# Patient Record
Sex: FEMALE | Race: WHITE | NOT HISPANIC OR LATINO | Employment: UNEMPLOYED | URBAN - METROPOLITAN AREA
[De-identification: names, ages, dates, MRNs, and addresses within clinical notes are randomized per-mention and may not be internally consistent; named-entity substitution may affect disease eponyms.]

---

## 2024-05-21 DIAGNOSIS — F41.1 GENERALIZED ANXIETY DISORDER: ICD-10-CM

## 2024-05-21 DIAGNOSIS — F90.1 ADHD (ATTENTION DEFICIT HYPERACTIVITY DISORDER), PREDOMINANTLY HYPERACTIVE IMPULSIVE TYPE: ICD-10-CM

## 2024-05-30 ENCOUNTER — OFFICE VISIT (OUTPATIENT)
Dept: FAMILY MEDICINE CLINIC | Facility: CLINIC | Age: 24
End: 2024-05-30
Payer: COMMERCIAL

## 2024-05-30 VITALS
DIASTOLIC BLOOD PRESSURE: 70 MMHG | RESPIRATION RATE: 18 BRPM | WEIGHT: 129 LBS | HEART RATE: 82 BPM | SYSTOLIC BLOOD PRESSURE: 108 MMHG | BODY MASS INDEX: 19.55 KG/M2 | HEIGHT: 68 IN | OXYGEN SATURATION: 99 %

## 2024-05-30 DIAGNOSIS — Z13.6 ENCOUNTER FOR LIPID SCREENING FOR CARDIOVASCULAR DISEASE: ICD-10-CM

## 2024-05-30 DIAGNOSIS — Z13.1 SCREENING FOR DIABETES MELLITUS: ICD-10-CM

## 2024-05-30 DIAGNOSIS — F90.1 ADHD (ATTENTION DEFICIT HYPERACTIVITY DISORDER), PREDOMINANTLY HYPERACTIVE IMPULSIVE TYPE: ICD-10-CM

## 2024-05-30 DIAGNOSIS — Z51.81 THERAPEUTIC DRUG MONITORING: ICD-10-CM

## 2024-05-30 DIAGNOSIS — F64.0 GENDER DYSPHORIA IN ADULT: ICD-10-CM

## 2024-05-30 DIAGNOSIS — F41.1 GENERALIZED ANXIETY DISORDER: ICD-10-CM

## 2024-05-30 DIAGNOSIS — Z23 NEED FOR VACCINATION: ICD-10-CM

## 2024-05-30 DIAGNOSIS — Z13.220 ENCOUNTER FOR LIPID SCREENING FOR CARDIOVASCULAR DISEASE: ICD-10-CM

## 2024-05-30 DIAGNOSIS — Z00.00 ROUTINE HEALTH MAINTENANCE: Primary | ICD-10-CM

## 2024-05-30 DIAGNOSIS — E34.9 HORMONE IMBALANCE: ICD-10-CM

## 2024-05-30 PROCEDURE — 90715 TDAP VACCINE 7 YRS/> IM: CPT | Performed by: FAMILY MEDICINE

## 2024-05-30 PROCEDURE — 99395 PREV VISIT EST AGE 18-39: CPT | Performed by: FAMILY MEDICINE

## 2024-05-30 PROCEDURE — 99214 OFFICE O/P EST MOD 30 MIN: CPT | Performed by: FAMILY MEDICINE

## 2024-05-30 PROCEDURE — 90471 IMMUNIZATION ADMIN: CPT | Performed by: FAMILY MEDICINE

## 2024-05-30 RX ORDER — BUPROPION HYDROCHLORIDE 150 MG/1
150 TABLET ORAL DAILY
Qty: 90 TABLET | Refills: 0 | OUTPATIENT
Start: 2024-05-30

## 2024-05-30 RX ORDER — BUPROPION HYDROCHLORIDE 150 MG/1
150 TABLET ORAL DAILY
Qty: 90 TABLET | Refills: 3 | Status: SHIPPED | OUTPATIENT
Start: 2024-05-30

## 2024-05-30 NOTE — PROGRESS NOTES
"Chief Complaint  Annual Exam    Subjective    History of Present Illness    Payal Ponce presents to [facility] for Annual Exam.  History of Present Illness     Here today for annual exam and to discuss preventive health maintenance.    Has not been seen in quite some time, is overdue for check of hormone levels. Continues on estradiol and progesterone as prescribed, no problems with self administration, no injection site reactions. Reports good adherence and tolerance overall, no unwanted side effects. Does feel like changes have plateaued somewhat.    Continues on bupropion for management of anxiety and ADHD. Feels that is working fairly well overall. No unwanted side effects. Due for refill today.    Apart from screening blood work she is overall caught up on preventive health recommendations. Due for tetanus booster today.    Weight is up somewhat since our last visit, happy with this weight gain.    Has good family and social support. Enjoys her work well enough. Gets regular dental exams.    Objective     Vital Signs:   /70   Pulse 82   Resp 18   Ht 172.7 cm (68\")   Wt 58.5 kg (129 lb)   SpO2 99%   BMI 19.61 kg/m²   Physical Exam  Vitals and nursing note reviewed.   Constitutional:       General: She is not in acute distress.     Appearance: Normal appearance. She is not ill-appearing.   Cardiovascular:      Rate and Rhythm: Normal rate and regular rhythm.      Pulses: Normal pulses.      Heart sounds: Normal heart sounds. No murmur heard.  Pulmonary:      Effort: Pulmonary effort is normal. No respiratory distress.      Breath sounds: Normal breath sounds. No rales.   Neurological:      Mental Status: She is alert and oriented to person, place, and time. Mental status is at baseline.   Psychiatric:         Mood and Affect: Mood normal.         Behavior: Behavior normal.                 Assessment and Plan   Diagnoses and all orders for this visit:    1. Routine " health maintenance (Primary)    2. Gender dysphoria in adult  -     Estradiol  -     Estrone  -     Testosterone  -     Comprehensive Metabolic Panel  -     Progesterone  -     Lipid Panel    3. Hormone imbalance  -     Estradiol  -     Estrone  -     Testosterone  -     Comprehensive Metabolic Panel  -     Progesterone  -     Lipid Panel    4. ADHD (attention deficit hyperactivity disorder), predominantly hyperactive impulsive type  -     buPROPion XL (Wellbutrin XL) 150 MG 24 hr tablet; Take 1 tablet by mouth Daily.  Dispense: 90 tablet; Refill: 3    5. Generalized anxiety disorder  -     buPROPion XL (Wellbutrin XL) 150 MG 24 hr tablet; Take 1 tablet by mouth Daily.  Dispense: 90 tablet; Refill: 3    6. Screening for diabetes mellitus  -     Comprehensive Metabolic Panel    7. Encounter for lipid screening for cardiovascular disease  -     Lipid Panel    8. Therapeutic drug monitoring  -     Estradiol  -     Estrone  -     Testosterone  -     Comprehensive Metabolic Panel  -     Progesterone  -     Lipid Panel    9. Need for vaccination  -     Tdap Vaccine Greater Than or Equal To 8yo IM    Orders as above. I will contact her with results as available. Continue regimen as prescribed. Refills as requested.    Vaccine as discussed.    BMI is within normal parameters. No other follow-up for BMI required.    Recommended follow up as below. Encouraged communication via "[employer]t in the meantime.     Patient was given instructions and counseling regarding her condition or for health maintenance advice. Please see specific information pulled into the AVS (placed there by myself) if appropriate.    Return in about 3 months (around 8/30/2024), or if symptoms worsen or fail to improve, for f/u gender-affirming hormone therapy.    ALIDA Escobar MD    Prevention counseling performed as below: Mindfulness for stress management.

## 2024-05-30 NOTE — PATIENT INSTRUCTIONS
Mindfulness apps: Headspace, Smiling Mind, Captree!    Mindfulness-Based Stress Reduction  Mindfulness-based stress reduction (MBSR) is a program that helps people learn to practice mindfulness. Mindfulness is the practice of intentionally paying attention to the present moment. It can be learned and practiced through techniques such as education, breathing exercises, meditation, and yoga. MBSR includes several mindfulness techniques in one program.  MBSR works best when you understand the treatment, are willing to try new things, and can commit to spending time practicing what you learn. MBSR training may include learning about:  How your emotions, thoughts, and reactions affect your body.  New ways to respond to things that cause negative thoughts to start (triggers).  How to notice your thoughts and let go of them.  Practicing awareness of everyday things that you normally do without thinking.  The techniques and goals of different types of meditation.  What are the benefits of MBSR?  MBSR can have many benefits, which include helping you to:  Develop self-awareness. This refers to knowing and understanding yourself.  Learn skills and attitudes that help you to participate in your own health care.  Learn new ways to care for yourself.  Be more accepting about how things are, and let things go.  Be less judgmental and approach things with an open mind.  Be patient with yourself and trust yourself more.  MBSR has also been shown to:  Reduce negative emotions, such as depression and anxiety.  Improve memory and focus.  Change how you sense and approach pain.  Boost your body's ability to fight infections.  Help you connect better with other people.  Improve your sense of well-being.  Follow these instructions at home:    Find a local in-person or online MBSR program.  Set aside some time regularly for mindfulness practice.  Find a mindfulness practice that works best for you. This may include one or more of the  following:  Meditation. Meditation involves focusing your mind on a certain thought or activity.  Breathing awareness exercises. These help you to stay present by focusing on your breath.  Body scan. For this practice, you lie down and pay attention to each part of your body from head to toe. You can identify tension and soreness and intentionally relax parts of your body.  Yoga. Yoga involves stretching and breathing, and it can improve your ability to move and be flexible. It can also provide an experience of testing your body's limits, which can help you release stress.  Mindful eating. This way of eating involves focusing on the taste, texture, color, and smell of each bite of food. Because this slows down eating and helps you feel full sooner, it can be an important part of a weight-loss plan.  Find a podcast or recording that provides guidance for breathing awareness, body scan, or meditation exercises. You can listen to these any time when you have a free moment to rest without distractions.  Follow your treatment plan as told by your health care provider. This may include taking regular medicines and making changes to your diet or lifestyle as recommended.  How to practice mindfulness  To do a basic awareness exercise:  Find a comfortable place to sit.  Pay attention to the present moment. Observe your thoughts, feelings, and surroundings just as they are.  Avoid placing judgment on yourself, your feelings, or your surroundings. Make note of any judgment that comes up, and let it go.  Your mind may wander, and that is okay. Make note of when your thoughts drift, and return your attention to the present moment.  To do basic mindfulness meditation:  Find a comfortable place to sit. This may include a stable chair or a firm floor cushion.  Sit upright with your back straight. Let your arms fall next to your side with your hands resting on your legs.  If sitting in a chair, rest your feet flat on the floor.  If  sitting on a cushion, cross your legs in front of you.  Keep your head in a neutral position with your chin dropped slightly. Relax your jaw and rest the tip of your tongue on the roof of your mouth. Drop your gaze to the floor. You can close your eyes if you like.  Breathe normally and pay attention to your breath. Feel the air moving in and out of your nose. Feel your belly expanding and relaxing with each breath.  Your mind may wander, and that is okay. Make note of when your thoughts drift, and return your attention to your breath.  Avoid placing judgment on yourself, your feelings, or your surroundings. Make note of any judgment or feelings that come up, let them go, and bring your attention back to your breath.  When you are ready, lift your gaze or open your eyes. Pay attention to how your body feels after the meditation.  Where to find more information  You can find more information about MBSR from:  Your health care provider.  Community-based meditation centers or programs.  Programs offered near you.  Summary  Mindfulness-based stress reduction (MBSR) is a program that teaches you how to intentionally pay attention to the present moment. It is used with other treatments to help you cope better with daily stress, emotions, and pain.  MBSR focuses on developing self-awareness, which allows you to respond to life stress without judgment or negative emotions.  MBSR programs may involve learning different mindfulness practices, such as breathing exercises, meditation, yoga, body scan, or mindful eating. Find a mindfulness practice that works best for you, and set aside time for it on a regular basis.  This information is not intended to replace advice given to you by your health care provider. Make sure you discuss any questions you have with your health care provider.  Document Released: 04/26/2018 Document Revised: 11/30/2018 Document Reviewed: 04/26/2018  Elsevier Patient Education © 2020 [employer] Inc[employer]

## 2024-06-13 LAB
ALBUMIN SERPL-MCNC: 4.4 G/DL (ref 3.5–5.2)
ALBUMIN/GLOB SERPL: 1.8 G/DL
ALP SERPL-CCNC: 56 U/L (ref 39–117)
ALT SERPL-CCNC: 22 U/L (ref 1–33)
AST SERPL-CCNC: 20 U/L (ref 1–32)
BILIRUB SERPL-MCNC: 0.4 MG/DL (ref 0–1.2)
BUN SERPL-MCNC: 6 MG/DL (ref 6–20)
BUN/CREAT SERPL: 6.5 (ref 7–25)
CALCIUM SERPL-MCNC: 9.3 MG/DL (ref 8.6–10.5)
CHLORIDE SERPL-SCNC: 105 MMOL/L (ref 98–107)
CHOLEST SERPL-MCNC: 171 MG/DL (ref 0–200)
CO2 SERPL-SCNC: 23 MMOL/L (ref 22–29)
CREAT SERPL-MCNC: 0.93 MG/DL (ref 0.57–1)
EGFRCR SERPLBLD CKD-EPI 2021: 88.8 ML/MIN/1.73
GLOBULIN SER CALC-MCNC: 2.5 GM/DL
GLUCOSE SERPL-MCNC: 118 MG/DL (ref 65–99)
HDLC SERPL-MCNC: 73 MG/DL (ref 40–60)
LDLC SERPL CALC-MCNC: 87 MG/DL (ref 0–100)
POTASSIUM SERPL-SCNC: 4 MMOL/L (ref 3.5–5.2)
PROT SERPL-MCNC: 6.9 G/DL (ref 6–8.5)
SODIUM SERPL-SCNC: 138 MMOL/L (ref 136–145)
TRIGL SERPL-MCNC: 58 MG/DL (ref 0–150)
VLDLC SERPL CALC-MCNC: 11 MG/DL (ref 5–40)

## 2024-06-14 LAB
ESTRADIOL SERPL-MCNC: 192 PG/ML
PROGEST SERPL-MCNC: 0.1 NG/ML
TESTOST SERPL-MCNC: 15 NG/DL (ref 13–71)

## 2024-06-17 LAB — ESTRONE SERPL-MCNC: 151 PG/ML (ref 27–231)

## 2024-06-30 DIAGNOSIS — F64.0 GENDER DYSPHORIA IN ADULT: ICD-10-CM

## 2024-06-30 DIAGNOSIS — E34.9 HORMONE IMBALANCE: ICD-10-CM

## 2024-07-01 RX ORDER — PROGESTERONE 50 MG/ML
INJECTION, SOLUTION INTRAMUSCULAR
Qty: 10 ML | Refills: 1 | Status: SHIPPED | OUTPATIENT
Start: 2024-07-01

## 2024-10-17 DIAGNOSIS — E34.9 HORMONE IMBALANCE: ICD-10-CM

## 2024-10-17 DIAGNOSIS — F64.0 GENDER DYSPHORIA IN ADULT: ICD-10-CM

## 2024-10-18 RX ORDER — ESTRADIOL VALERATE 20 MG/ML
INJECTION INTRAMUSCULAR
Qty: 5 ML | Refills: 3 | Status: SHIPPED | OUTPATIENT
Start: 2024-10-18

## 2024-10-23 ENCOUNTER — OFFICE VISIT (OUTPATIENT)
Dept: FAMILY MEDICINE CLINIC | Facility: CLINIC | Age: 24
End: 2024-10-23
Payer: COMMERCIAL

## 2024-10-23 VITALS
SYSTOLIC BLOOD PRESSURE: 102 MMHG | RESPIRATION RATE: 18 BRPM | HEIGHT: 68 IN | BODY MASS INDEX: 19.7 KG/M2 | WEIGHT: 130 LBS | OXYGEN SATURATION: 99 % | HEART RATE: 82 BPM | DIASTOLIC BLOOD PRESSURE: 82 MMHG

## 2024-10-23 DIAGNOSIS — E34.9 HORMONE IMBALANCE: ICD-10-CM

## 2024-10-23 DIAGNOSIS — Z51.81 THERAPEUTIC DRUG MONITORING: ICD-10-CM

## 2024-10-23 DIAGNOSIS — Z23 NEED FOR VACCINATION: ICD-10-CM

## 2024-10-23 DIAGNOSIS — N50.89 ATROPHY OF MALE GENITAL ORGANS: ICD-10-CM

## 2024-10-23 DIAGNOSIS — F64.0 GENDER DYSPHORIA IN ADULT: Primary | ICD-10-CM

## 2024-10-23 PROCEDURE — 90656 IIV3 VACC NO PRSV 0.5 ML IM: CPT | Performed by: FAMILY MEDICINE

## 2024-10-23 PROCEDURE — 91320 SARSCV2 VAC 30MCG TRS-SUC IM: CPT | Performed by: FAMILY MEDICINE

## 2024-10-23 PROCEDURE — 90471 IMMUNIZATION ADMIN: CPT | Performed by: FAMILY MEDICINE

## 2024-10-23 PROCEDURE — 99214 OFFICE O/P EST MOD 30 MIN: CPT | Performed by: FAMILY MEDICINE

## 2024-10-23 PROCEDURE — 90480 ADMN SARSCOV2 VAC 1/ONLY CMP: CPT | Performed by: FAMILY MEDICINE

## 2024-10-23 NOTE — PROGRESS NOTES
"Chief Complaint  Gender Dysphoria in Adult    Subjective    History of Present Illness    Payal Ponce presents to [facility] for Gender Dysphoria in Adult.  History of Present Illness     Here today for follow-up as above. Doing fairly well overall, continues on estradiol and progesterone injections as prescribed. Reports good adherence and tolerance, no unwanted side effects. No problems with self administration, no injection site reactions. Generally happy with the state of her transition. Interested in a referral to urology to discuss bottom surgery options.    Has noticed some thinning of the penile skin and pain with erections, intercourse, and general movement at times. Wondering about options for management.    Objective     Vital Signs:   /82   Pulse 82   Resp 18   Ht 172.7 cm (68\")   Wt 59 kg (130 lb)   SpO2 99%   BMI 19.77 kg/m²   Physical Exam  Vitals and nursing note reviewed.   Constitutional:       General: She is not in acute distress.     Appearance: Normal appearance. She is not ill-appearing.   Cardiovascular:      Rate and Rhythm: Normal rate and regular rhythm.      Pulses: Normal pulses.      Heart sounds: Normal heart sounds. No murmur heard.  Pulmonary:      Effort: Pulmonary effort is normal. No respiratory distress.      Breath sounds: Normal breath sounds. No rales.   Neurological:      Mental Status: She is alert and oriented to person, place, and time. Mental status is at baseline.   Psychiatric:         Mood and Affect: Mood normal.         Behavior: Behavior normal.                 Assessment and Plan   Diagnoses and all orders for this visit:    1. Gender dysphoria in adult (Primary)  -     Estradiol  -     Estrone  -     Testosterone  -     Comprehensive Metabolic Panel  -     Progesterone  -     Lipid Panel  -     Ambulatory Referral to Urology    2. Hormone imbalance  -     Estradiol  -     Estrone  -     Testosterone  -     " Comprehensive Metabolic Panel  -     Progesterone  -     Lipid Panel  -     Ambulatory Referral to Urology    3. Need for vaccination  -     Fluzone >6mos ([id number])  -     COVID-19 (Pfizer) 12yrs+ (COMIRNATY)    4. Therapeutic drug monitoring  -     Estradiol  -     Estrone  -     Testosterone  -     Comprehensive Metabolic Panel  -     Progesterone  -     Lipid Panel    5. 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  Dispense: 7.32 g; Refill: 0    Orders as above. I will contact her with results as available. Continue regimen as prescribed. Will try some topical testosterone to help with penile atrophy. Referral to urology as discussed.    Recommended follow up as below. Encouraged communication via [employer] in the meantime.     Patient was given instructions and counseling regarding her condition or for health maintenance advice. Please see specific information pulled into the AVS (placed there by myself) if appropriate.    Return in about 3 months (around 1/23/2025), or if symptoms worsen or fail to improve, for f/u gender-affirming hormone therapy.    ALIDA Escobar MD

## 2024-10-24 LAB
ALBUMIN SERPL-MCNC: 4.1 G/DL (ref 3.5–5.2)
ALBUMIN/GLOB SERPL: 1.5 G/DL
ALP SERPL-CCNC: 56 U/L (ref 39–117)
ALT SERPL-CCNC: 14 U/L (ref 1–33)
AST SERPL-CCNC: 15 U/L (ref 1–32)
BILIRUB SERPL-MCNC: 0.3 MG/DL (ref 0–1.2)
BUN SERPL-MCNC: 8 MG/DL (ref 6–20)
BUN/CREAT SERPL: 8.8 (ref 7–25)
CALCIUM SERPL-MCNC: 9.1 MG/DL (ref 8.6–10.5)
CHLORIDE SERPL-SCNC: 106 MMOL/L (ref 98–107)
CHOLEST SERPL-MCNC: 168 MG/DL (ref 0–200)
CO2 SERPL-SCNC: 23.8 MMOL/L (ref 22–29)
CREAT SERPL-MCNC: 0.91 MG/DL (ref 0.57–1)
EGFRCR SERPLBLD CKD-EPI 2021: 91.1 ML/MIN/1.73
ESTRADIOL SERPL-MCNC: 198 PG/ML
GLOBULIN SER CALC-MCNC: 2.8 GM/DL
GLUCOSE SERPL-MCNC: 103 MG/DL (ref 65–99)
HDLC SERPL-MCNC: 64 MG/DL (ref 40–60)
LDLC SERPL CALC-MCNC: 92 MG/DL (ref 0–100)
POTASSIUM SERPL-SCNC: 4.2 MMOL/L (ref 3.5–5.2)
PROGEST SERPL-MCNC: 0.8 NG/ML
PROT SERPL-MCNC: 6.9 G/DL (ref 6–8.5)
SODIUM SERPL-SCNC: 139 MMOL/L (ref 136–145)
TESTOST SERPL-MCNC: 11 NG/DL (ref 13–71)
TRIGL SERPL-MCNC: 60 MG/DL (ref 0–150)
VLDLC SERPL CALC-MCNC: 12 MG/DL (ref 5–40)

## 2024-10-25 LAB — ESTRONE SERPL-MCNC: 118 PG/ML (ref 27–231)

## 2025-01-06 DIAGNOSIS — N50.89 ATROPHY OF MALE GENITAL ORGANS: Primary | ICD-10-CM

## 2025-01-06 RX ORDER — TESTOSTERONE 25 MG/2.5G
25 GEL TRANSDERMAL DAILY
Qty: 30 EACH | Refills: 0 | Status: SHIPPED | OUTPATIENT
Start: 2025-01-06

## 2025-01-08 ENCOUNTER — PRIOR AUTHORIZATION (OUTPATIENT)
Dept: FAMILY MEDICINE CLINIC | Facility: CLINIC | Age: 25
End: 2025-01-08
Payer: COMMERCIAL

## 2025-01-17 NOTE — TELEPHONE ENCOUNTER
PA response not received. Contacted insurance for follow-up. They were unable to locate the prior authorization. Initiated new PA. Forms to be faxed and completed in our office and faxed back for insurance to review. Patient informed via [employer].

## 2025-02-25 DIAGNOSIS — N50.89 ATROPHY OF MALE GENITAL ORGANS: ICD-10-CM

## 2025-02-25 RX ORDER — TESTOSTERONE GEL, 1% 10 MG/G
25 GEL TRANSDERMAL DAILY
Qty: 60 EACH | Refills: 5 | Status: SHIPPED | OUTPATIENT
Start: 2025-02-25

## 2025-04-15 ENCOUNTER — OFFICE VISIT (OUTPATIENT)
Dept: FAMILY MEDICINE CLINIC | Facility: CLINIC | Age: 25
End: 2025-04-15
Payer: COMMERCIAL

## 2025-04-15 VITALS
RESPIRATION RATE: 14 BRPM | OXYGEN SATURATION: 97 % | HEIGHT: 68 IN | SYSTOLIC BLOOD PRESSURE: 114 MMHG | HEART RATE: 72 BPM | DIASTOLIC BLOOD PRESSURE: 80 MMHG | BODY MASS INDEX: 19.84 KG/M2 | WEIGHT: 130.9 LBS

## 2025-04-15 DIAGNOSIS — E34.9 HORMONE IMBALANCE: ICD-10-CM

## 2025-04-15 DIAGNOSIS — F90.1 ADHD (ATTENTION DEFICIT HYPERACTIVITY DISORDER), PREDOMINANTLY HYPERACTIVE IMPULSIVE TYPE: ICD-10-CM

## 2025-04-15 DIAGNOSIS — Z51.81 THERAPEUTIC DRUG MONITORING: ICD-10-CM

## 2025-04-15 DIAGNOSIS — F64.0 GENDER DYSPHORIA IN ADULT: Primary | ICD-10-CM

## 2025-04-15 DIAGNOSIS — F41.1 GENERALIZED ANXIETY DISORDER: ICD-10-CM

## 2025-04-15 LAB
ALBUMIN SERPL-MCNC: 3.9 G/DL (ref 3.5–5.2)
ALBUMIN/GLOB SERPL: 1.4 G/DL
ALP SERPL-CCNC: 52 U/L (ref 39–117)
ALT SERPL-CCNC: 13 U/L (ref 1–33)
AST SERPL-CCNC: 13 U/L (ref 1–32)
BILIRUB SERPL-MCNC: 0.3 MG/DL (ref 0–1.2)
BUN SERPL-MCNC: 8 MG/DL (ref 6–20)
BUN/CREAT SERPL: 8.6 (ref 7–25)
CALCIUM SERPL-MCNC: 9.3 MG/DL (ref 8.6–10.5)
CHLORIDE SERPL-SCNC: 106 MMOL/L (ref 98–107)
CHOLEST SERPL-MCNC: 160 MG/DL (ref 0–200)
CO2 SERPL-SCNC: 23 MMOL/L (ref 22–29)
CREAT SERPL-MCNC: 0.93 MG/DL (ref 0.57–1)
EGFRCR SERPLBLD CKD-EPI 2021: 88.2 ML/MIN/1.73
GLOBULIN SER CALC-MCNC: 2.7 GM/DL
GLUCOSE SERPL-MCNC: 94 MG/DL (ref 65–99)
HDLC SERPL-MCNC: 65 MG/DL (ref 40–60)
LDLC SERPL CALC-MCNC: 84 MG/DL (ref 0–100)
POTASSIUM SERPL-SCNC: 4 MMOL/L (ref 3.5–5.2)
PROT SERPL-MCNC: 6.6 G/DL (ref 6–8.5)
SODIUM SERPL-SCNC: 139 MMOL/L (ref 136–145)
TRIGL SERPL-MCNC: 50 MG/DL (ref 0–150)
VLDLC SERPL CALC-MCNC: 11 MG/DL (ref 5–40)

## 2025-04-15 RX ORDER — PROGESTERONE 50 MG/ML
10 INJECTION, SOLUTION INTRAMUSCULAR WEEKLY
Qty: 10 ML | Refills: 5 | Status: SHIPPED | OUTPATIENT
Start: 2025-04-15

## 2025-04-15 RX ORDER — BUSPIRONE HYDROCHLORIDE 5 MG/1
5 TABLET ORAL 3 TIMES DAILY
Qty: 90 TABLET | Refills: 0 | Status: SHIPPED | OUTPATIENT
Start: 2025-04-15

## 2025-04-15 RX ORDER — ESTRADIOL VALERATE 20 MG/ML
4 INJECTION INTRAMUSCULAR WEEKLY
Qty: 5 ML | Refills: 5 | Status: SHIPPED | OUTPATIENT
Start: 2025-04-15

## 2025-04-15 RX ORDER — BUPROPION HYDROCHLORIDE 150 MG/1
150 TABLET ORAL DAILY
Qty: 90 TABLET | Refills: 3 | Status: SHIPPED | OUTPATIENT
Start: 2025-04-15

## 2025-04-15 NOTE — PROGRESS NOTES
"Chief Complaint  Gender dysphoria in adult and Anxiety (Med request for PRN anti-anxiety medication)    Subjective    History of Present Illness  History of Present Illness  The patient presents for evaluation of gender dysphoria as well as anxiety.    Hormone therapy includes estradiol and progesterone, with recent replenishment of both medications. The regimen consists of 0.2 mg of estradiol and 1.2 mg of progesterone administered weekly, with the most recent dose given last Thursday. This regimen has been maintained for approximately 2 to 3 years.    She takes bupropion for management of generalized anxiety disorder but still has intermittent bouts of increased anxiety. Wondering about a as needed medication. Would like something nonhabit-forming. Has not tried buspirone in the past.      Objective     Vital Signs:   /80   Pulse 72   Resp 14   Ht 172.7 cm (68\")   Wt 59.4 kg (130 lb 14.4 oz)   SpO2 97%   BMI 19.90 kg/m²   Physical Exam  Vitals and nursing note reviewed.   Constitutional:       General: She is not in acute distress.     Appearance: Normal appearance. She is not ill-appearing.   Cardiovascular:      Rate and Rhythm: Normal rate and regular rhythm.      Pulses: Normal pulses.      Heart sounds: Normal heart sounds. No murmur heard.  Pulmonary:      Effort: Pulmonary effort is normal. No respiratory distress.      Breath sounds: Normal breath sounds. No rales.   Neurological:      Mental Status: She is alert and oriented to person, place, and time. Mental status is at baseline.   Psychiatric:         Mood and Affect: Mood normal.         Behavior: Behavior normal.            Assessment and Plan   Diagnoses and all orders for this visit:    1. Gender dysphoria in adult (Primary)  -     estradiol valerate (DELESTROGEN) 20 MG/ML injection; Inject 0.2 mL into the appropriate muscle as directed by prescriber 1 (One) Time Per Week. Please discard vial 28 days after opening/puncturing.  Dispense: " 5 mL; Refill: 5  -     progesterone oil 50 MG/ML injection; Inject 0.2 mL into the appropriate muscle as directed by prescriber 1 (One) Time Per Week. Please discard vial 28 days after opening/puncturing.  Dispense: 10 mL; Refill: 5  -     Estradiol  -     Estrone  -     Testosterone  -     Comprehensive Metabolic Panel  -     Progesterone  -     Lipid Panel    2. Hormone imbalance  -     estradiol valerate (DELESTROGEN) 20 MG/ML injection; Inject 0.2 mL into the appropriate muscle as directed by prescriber 1 (One) Time Per Week. Please discard vial 28 days after opening/puncturing.  Dispense: 5 mL; Refill: 5  -     progesterone oil 50 MG/ML injection; Inject 0.2 mL into the appropriate muscle as directed by prescriber 1 (One) Time Per Week. Please discard vial 28 days after opening/puncturing.  Dispense: 10 mL; Refill: 5  -     Estradiol  -     Estrone  -     Testosterone  -     Comprehensive Metabolic Panel  -     Progesterone  -     Lipid Panel    3. ADHD (attention deficit hyperactivity disorder), predominantly hyperactive impulsive type  -     buPROPion XL (Wellbutrin XL) 150 MG 24 hr tablet; Take 1 tablet by mouth Daily.  Dispense: 90 tablet; Refill: 3    4. Generalized anxiety disorder  -     buPROPion XL (Wellbutrin XL) 150 MG 24 hr tablet; Take 1 tablet by mouth Daily.  Dispense: 90 tablet; Refill: 3  -     busPIRone (BUSPAR) 5 MG tablet; Take 1 tablet by mouth 3 (Three) Times a Day.  Dispense: 90 tablet; Refill: 0    5. Therapeutic drug monitoring  -     Estradiol  -     Estrone  -     Testosterone  -     Comprehensive Metabolic Panel  -     Progesterone  -     Lipid Panel      Assessment & Plan  1. Gender dysphoria.  - Refills for estradiol and progesterone provided, with additional refills included.    2. Anxiety.  - Prescription for buspirone 5 mg, to be taken three times daily, has been issued.  - Provided with 90 tablets, allowing dosage adjustment as needed.  - Advised to start at 5 mg and gradually  increase the dosage.  - Instructed to inform the office if an optimal dose is identified for prescription adjustment.  - Bupropion refilled. Continue as prescribed.    Recommended follow up as below. Encouraged communication via MyChart in the meantime.     Patient was given instructions and counseling regarding her condition or for health maintenance advice. Please see specific information pulled into the AVS (placed there by myself) if appropriate.    Return in about 6 months (around 10/15/2025), or if symptoms worsen or fail to improve, for f/u gender-affirming hormone therapy.    Patient or patient representative verbalized consent for the use of Ambient Listening during the visit with  Tristian Escobar MD for chart documentation. 4/15/2025  11:15 EDT    ALIDA Escobar MD

## 2025-04-16 LAB
ESTRADIOL SERPL-MCNC: 327 PG/ML
ESTRONE SERPL-MCNC: 239 PG/ML (ref 27–231)
PROGEST SERPL-MCNC: 0.6 NG/ML
TESTOST SERPL-MCNC: 50 NG/DL (ref 13–71)

## 2025-06-23 ENCOUNTER — OFFICE VISIT (OUTPATIENT)
Dept: FAMILY MEDICINE CLINIC | Facility: CLINIC | Age: 25
End: 2025-06-23
Payer: COMMERCIAL

## 2025-06-23 VITALS
BODY MASS INDEX: 19.14 KG/M2 | RESPIRATION RATE: 14 BRPM | DIASTOLIC BLOOD PRESSURE: 88 MMHG | WEIGHT: 126.3 LBS | SYSTOLIC BLOOD PRESSURE: 118 MMHG | HEART RATE: 61 BPM | HEIGHT: 68 IN | OXYGEN SATURATION: 96 %

## 2025-06-23 DIAGNOSIS — F33.1 MAJOR DEPRESSIVE DISORDER, RECURRENT EPISODE, MODERATE DEGREE: Primary | ICD-10-CM

## 2025-06-23 PROCEDURE — 99213 OFFICE O/P EST LOW 20 MIN: CPT | Performed by: FAMILY MEDICINE

## 2025-06-23 NOTE — PROGRESS NOTES
"Chief Complaint  Depression (Hx of depression, last 2 to 3 months gradually worsening per partner.)    Subjective    History of Present Illness  History of Present Illness  The patient presents for evaluation of mood issues.    She reports a perceived decrease in her baseline mood, which is corroborated by her partner. She is not currently engaged in any therapeutic interventions. She has previously been on Prozac but experienced significant side effects, including sexual dysfunction. She believes that the bupropion is working fairly well, would be interested in trying to increase the dose.      Objective     Vital Signs:   /88   Pulse 61   Resp 14   Ht 172.7 cm (68\")   Wt 57.3 kg (126 lb 4.8 oz)   SpO2 96%   BMI 19.20 kg/m²   Physical Exam  Vitals and nursing note reviewed.   Constitutional:       Appearance: Normal appearance.   Neurological:      General: No focal deficit present.      Mental Status: She is alert and oriented to person, place, and time.   Psychiatric:         Mood and Affect: Mood normal.         Behavior: Behavior normal.            Assessment and Plan   Diagnoses and all orders for this visit:    1. Major depressive disorder, recurrent episode, moderate degree (Primary)      Assessment & Plan  1. Depression.  - Advised to double current bupropion dosage from 150 mg to 300 mg and monitor effectiveness; prescription for bupropion 300 mg will be provided if beneficial. Alternative medications, including SSRIs, will be considered if no improvement.    Recommended follow up as below. Encouraged communication via [employer] in the meantime.     Patient was given instructions and counseling regarding her condition or for health maintenance advice. Please see specific information pulled into the AVS (placed there by myself) if appropriate.    Return in about 3 months (around 9/23/2025), or if symptoms worsen or fail to improve, for Preventive Health Maintenance.    Patient or patient " representative verbalized consent for the use of Ambient Listening during the visit with  Tristian Escobar MD for chart documentation. 6/23/2025  11:34 EDT    ALIDA Escobar MD